# Patient Record
Sex: MALE | Race: BLACK OR AFRICAN AMERICAN | NOT HISPANIC OR LATINO | URBAN - METROPOLITAN AREA
[De-identification: names, ages, dates, MRNs, and addresses within clinical notes are randomized per-mention and may not be internally consistent; named-entity substitution may affect disease eponyms.]

---

## 2022-03-09 ENCOUNTER — OFFICE VISIT (OUTPATIENT)
Dept: URBAN - METROPOLITAN AREA TELEHEALTH 2 | Facility: TELEHEALTH | Age: 57
End: 2022-03-09
Payer: COMMERCIAL

## 2022-03-09 DIAGNOSIS — R10.84 ABDOMINAL CRAMPING, GENERALIZED: ICD-10-CM

## 2022-03-09 DIAGNOSIS — M79.18 MUSCULOSKELETAL PAIN: ICD-10-CM

## 2022-03-09 DIAGNOSIS — K57.30 DIVERTICULAR DISEASE OF COLON: ICD-10-CM

## 2022-03-09 DIAGNOSIS — Z91.14 NON COMPLIANCE W MEDICATION REGIMEN: ICD-10-CM

## 2022-03-09 PROBLEM — 733657002: Status: ACTIVE | Noted: 2022-03-09

## 2022-03-09 PROBLEM — 34713006: Status: ACTIVE | Noted: 2022-03-09

## 2022-03-09 PROCEDURE — 99204 OFFICE O/P NEW MOD 45 MIN: CPT | Performed by: INTERNAL MEDICINE

## 2022-03-09 PROCEDURE — 99244 OFF/OP CNSLTJ NEW/EST MOD 40: CPT | Performed by: INTERNAL MEDICINE

## 2022-03-09 RX ORDER — BENAZEPRIL HYDROCHLORIDE 10 MG/1
TAKE 1 TABLET (10 MG) BY ORAL ROUTE ONCE DAILY TABLET, FILM COATED ORAL 1
Qty: 0 | Refills: 0 | Status: ACTIVE | COMMUNITY
Start: 1900-01-01

## 2022-03-09 RX ORDER — LINACLOTIDE 72 UG/1
TAKE 1 CAPSULE BY ORAL ROUTE DAILY FOR 90 DAYS CAPSULE, GELATIN COATED ORAL 1
Qty: 90 | Refills: 3 | COMMUNITY
Start: 2018-01-10

## 2022-03-09 RX ORDER — PRUCALOPRIDE 1 MG/1
1 TABLET TABLET, FILM COATED ORAL ONCE A DAY
Qty: 90 TABLET | Refills: 3 | OUTPATIENT
Start: 2022-03-09 | End: 2023-03-04

## 2022-03-09 RX ORDER — ASPIRIN 81 MG/1
TAKE 1 TABLET (81 MG) BY ORAL ROUTE ONCE DAILY TABLET, COATED ORAL 1
Qty: 0 | Refills: 0 | Status: ACTIVE | COMMUNITY
Start: 1900-01-01

## 2022-03-09 RX ORDER — CHLORTHALIDONE 25 MG/1
TAKE 1 TABLET (25 MG) BY ORAL ROUTE ONCE DAILY TABLET ORAL 1
Qty: 0 | Refills: 0 | Status: ACTIVE | COMMUNITY
Start: 1900-01-01

## 2022-03-09 RX ORDER — LINACLOTIDE 72 UG/1
TAKE 1 CAPSULE BY ORAL ROUTE DAILY FOR 90 DAYS CAPSULE, GELATIN COATED ORAL 1
OUTPATIENT
Start: 2018-01-10

## 2022-03-09 NOTE — HPI-TODAY'S VISIT:
The patient has a history of atypical chest pain, diverticular disease of the colon, CAD who presents for evaluaton of right sided abdominal pain with gas and bloating. The pt has a history of IBS wioth constipation and he has been on Linzess in the past but is now off the medication.  He has struggled with constipation but has been non-compliant with the dietary and exercise regimen that I have laid out for him.   The patient's consultation note will be sent to the referring MD, Dr. Daniel Loja.

## 2023-09-05 ENCOUNTER — OFFICE VISIT (OUTPATIENT)
Dept: URBAN - METROPOLITAN AREA SURGERY CENTER 16 | Facility: SURGERY CENTER | Age: 58
End: 2023-09-05

## 2023-11-01 ENCOUNTER — OFFICE VISIT (OUTPATIENT)
Dept: URBAN - METROPOLITAN AREA SURGERY CENTER 16 | Facility: SURGERY CENTER | Age: 58
End: 2023-11-01

## 2024-01-18 ENCOUNTER — OFFICE VISIT (OUTPATIENT)
Dept: URBAN - METROPOLITAN AREA CLINIC 105 | Facility: CLINIC | Age: 59
End: 2024-01-18

## 2024-02-01 ENCOUNTER — OV EP (OUTPATIENT)
Dept: URBAN - METROPOLITAN AREA CLINIC 105 | Facility: CLINIC | Age: 59
End: 2024-02-01
Payer: COMMERCIAL

## 2024-02-01 VITALS
WEIGHT: 198.2 LBS | HEART RATE: 80 BPM | SYSTOLIC BLOOD PRESSURE: 151 MMHG | BODY MASS INDEX: 33.02 KG/M2 | TEMPERATURE: 97.1 F | DIASTOLIC BLOOD PRESSURE: 92 MMHG | HEIGHT: 65 IN

## 2024-02-01 DIAGNOSIS — K57.30 DIVERTICULAR DISEASE OF COLON: ICD-10-CM

## 2024-02-01 DIAGNOSIS — Z12.11 SCREENING FOR COLON CANCER: ICD-10-CM

## 2024-02-01 DIAGNOSIS — R19.4 CHANGE IN BOWEL HABITS: ICD-10-CM

## 2024-02-01 PROCEDURE — 99204 OFFICE O/P NEW MOD 45 MIN: CPT | Performed by: INTERNAL MEDICINE

## 2024-02-01 RX ORDER — CHLORTHALIDONE 25 MG/1
TAKE 1 TABLET (25 MG) BY ORAL ROUTE ONCE DAILY TABLET ORAL 1
Qty: 0 | Refills: 0 | Status: ACTIVE | COMMUNITY
Start: 1900-01-01

## 2024-02-01 RX ORDER — PRUCALOPRIDE 1 MG/1
1 TABLET TABLET, FILM COATED ORAL ONCE A DAY
Qty: 90 TABLET | Refills: 3 | OUTPATIENT
Start: 2024-02-01 | End: 2025-01-26

## 2024-02-01 RX ORDER — BENAZEPRIL HYDROCHLORIDE 10 MG/1
TAKE 1 TABLET (10 MG) BY ORAL ROUTE ONCE DAILY TABLET, FILM COATED ORAL 1
Qty: 0 | Refills: 0 | Status: ACTIVE | COMMUNITY
Start: 1900-01-01

## 2024-02-01 RX ORDER — ASPIRIN 81 MG/1
TAKE 1 TABLET (81 MG) BY ORAL ROUTE ONCE DAILY TABLET, COATED ORAL 1
Qty: 0 | Refills: 0 | Status: ACTIVE | COMMUNITY
Start: 1900-01-01

## 2024-02-01 NOTE — HPI-TODAY'S VISIT:
The patient with history of diverticular disease of the colon and incomplete evacuation of stool who presents for a screening colon. The pt was due for f/u colon in 2023 and he was deployed workign for [employer] and was out of town. The pt has done well on Motegreity 1mg daily and Metamucil BID. He denies melena, hematemeisis or hematochezia.  The patient's time of visit DOS is 45 minutes.

## 2024-03-06 ENCOUNTER — LAB (OUTPATIENT)
Dept: URBAN - METROPOLITAN AREA CLINIC 4 | Facility: CLINIC | Age: 59
End: 2024-03-06
Payer: COMMERCIAL

## 2024-03-06 ENCOUNTER — COLON (OUTPATIENT)
Dept: URBAN - METROPOLITAN AREA SURGERY CENTER 16 | Facility: SURGERY CENTER | Age: 59
End: 2024-03-06
Payer: COMMERCIAL

## 2024-03-06 DIAGNOSIS — D12.3 BENIGN NEOPLASM OF TRANSVERSE COLON: ICD-10-CM

## 2024-03-06 DIAGNOSIS — Z12.11 COLON CANCER SCREENING: ICD-10-CM

## 2024-03-06 DIAGNOSIS — D12.3 ADENOMA OF TRANSVERSE COLON: ICD-10-CM

## 2024-03-06 DIAGNOSIS — K63.5 BENIGN COLON POLYP: ICD-10-CM

## 2024-03-06 PROCEDURE — 45385 COLONOSCOPY W/LESION REMOVAL: CPT | Performed by: INTERNAL MEDICINE

## 2024-03-06 PROCEDURE — 45380 COLONOSCOPY AND BIOPSY: CPT | Performed by: INTERNAL MEDICINE

## 2024-03-06 PROCEDURE — 88305 TISSUE EXAM BY PATHOLOGIST: CPT | Performed by: PATHOLOGY

## 2024-03-06 RX ORDER — ASPIRIN 81 MG/1
TAKE 1 TABLET (81 MG) BY ORAL ROUTE ONCE DAILY TABLET, COATED ORAL 1
Qty: 0 | Refills: 0 | Status: ACTIVE | COMMUNITY
Start: 1900-01-01

## 2024-03-06 RX ORDER — CHLORTHALIDONE 25 MG/1
TAKE 1 TABLET (25 MG) BY ORAL ROUTE ONCE DAILY TABLET ORAL 1
Qty: 0 | Refills: 0 | Status: ACTIVE | COMMUNITY
Start: 1900-01-01

## 2024-03-06 RX ORDER — PRUCALOPRIDE 1 MG/1
1 TABLET TABLET, FILM COATED ORAL ONCE A DAY
Qty: 90 TABLET | Refills: 3 | Status: ACTIVE | COMMUNITY
Start: 2024-02-01 | End: 2025-01-26

## 2024-03-06 RX ORDER — BENAZEPRIL HYDROCHLORIDE 10 MG/1
TAKE 1 TABLET (10 MG) BY ORAL ROUTE ONCE DAILY TABLET, FILM COATED ORAL 1
Qty: 0 | Refills: 0 | Status: ACTIVE | COMMUNITY
Start: 1900-01-01